# Patient Record
Sex: FEMALE | Race: AMERICAN INDIAN OR ALASKA NATIVE | URBAN - METROPOLITAN AREA
[De-identification: names, ages, dates, MRNs, and addresses within clinical notes are randomized per-mention and may not be internally consistent; named-entity substitution may affect disease eponyms.]

---

## 2024-07-03 ENCOUNTER — OFFICE VISIT (OUTPATIENT)
Age: 64
End: 2024-07-03
Payer: MEDICARE

## 2024-07-03 VITALS — HEIGHT: 64 IN | BODY MASS INDEX: 27.66 KG/M2 | WEIGHT: 162 LBS

## 2024-07-03 DIAGNOSIS — R22.32 MASS OF LEFT WRIST: Primary | ICD-10-CM

## 2024-07-03 PROCEDURE — 3017F COLORECTAL CA SCREEN DOC REV: CPT

## 2024-07-03 PROCEDURE — 1036F TOBACCO NON-USER: CPT

## 2024-07-03 PROCEDURE — 73130 X-RAY EXAM OF HAND: CPT

## 2024-07-03 PROCEDURE — G8400 PT W/DXA NO RESULTS DOC: HCPCS

## 2024-07-03 PROCEDURE — 1123F ACP DISCUSS/DSCN MKR DOCD: CPT

## 2024-07-03 PROCEDURE — 1090F PRES/ABSN URINE INCON ASSESS: CPT

## 2024-07-03 PROCEDURE — G8419 CALC BMI OUT NRM PARAM NOF/U: HCPCS

## 2024-07-03 PROCEDURE — G8427 DOCREV CUR MEDS BY ELIG CLIN: HCPCS

## 2024-07-03 PROCEDURE — 99204 OFFICE O/P NEW MOD 45 MIN: CPT

## 2024-07-03 NOTE — PROGRESS NOTES
of care, and documentation.    Note: This note was completed using voice recognition software.  Any typographical/name errors or mistakes are unintentional.

## 2024-09-03 ENCOUNTER — CLINICAL DOCUMENTATION (OUTPATIENT)
Age: 64
End: 2024-09-03

## 2024-09-03 NOTE — PROGRESS NOTES
Patient called to cancel H&P appt scheduled 9/4 for surgery on 9/17.  Patient was advised if she still planned to proceed with sugery we would need to see her in the office prior to 9/17.  Patient stated she was \"still thinking about it\"  Advised patient that orders would be sent over to the hospital today, as surgery is two weeks away.  Patient was given my direct number here at [facility] to call me ASAP with decision for surgery.

## 2024-09-05 ENCOUNTER — PREP FOR PROCEDURE (OUTPATIENT)
Age: 64
End: 2024-09-05

## 2024-09-05 DIAGNOSIS — R22.32 MASS OF LEFT WRIST: ICD-10-CM

## 2024-09-05 NOTE — PROGRESS NOTES
Called patient to see if she had made a determination about surgery scheduled for 9/17, but reached patient's VM on both home and mobile numbers.  LVM to please call me back as soon as possible.

## 2025-05-01 ENCOUNTER — HOSPITAL ENCOUNTER (OUTPATIENT)
Facility: HOSPITAL | Age: 65
Setting detail: RECURRING SERIES
Discharge: HOME | End: 2025-05-04
Payer: MEDICARE

## 2025-05-01 PROCEDURE — 97530 THERAPEUTIC ACTIVITIES: CPT

## 2025-05-01 PROCEDURE — 97161 PT EVAL LOW COMPLEX 20 MIN: CPT

## 2025-05-01 NOTE — PROGRESS NOTES
[facility]  [address], [facility], [address] Phone: [phone] Fax [phone]  Plan of Care / Statement of Necessity for Physical Therapy Services     Patient Name: Maria Del Rosario Shields : 1960   Medical   Diagnosis: Left foot pain Treatment Diagnosis: M79.672  LEFT FOOT PAIN      Onset Date:  Payor   Payor: MEDICARE / Plan: MEDICARE PART A AND B / Product Type: *No Product type* /    Referral Source: Jose Guadalupe Houston MD Start of Care (SOC): 2025   Prior Hospitalization: See medical history Provider #: [id number]   Prior Level of Function: Functionally IND    Comorbidities: N/A       Assessment / key information:  Pt is a 63 y/o female presenting with c/o pain along the plantar surface of the L foot at the 2nd MTP joint. Referring MD instructed her there was capsulitis present after she struck her foot on the wheel of her son's luggage. Over the last 2-3 months, since the incident, she feels that the toe is only feeling ~50% better. She has not been doing any extra pain modulation/management at home, so she was educated on methods to address the symptoms. There are not many functional implications with the current s/sx, but the pt's timeline of pain suggests she is appropriate for PT services to assist with progressing her towards her PLOF. Anticipate she will not require many sessions with PT, but will address as appropriate.     Gait: No AD. Unremarkable gait pattern on level surfaces.      Palpation: TTP along the MTP of the L 2nd digit. Tenderness noted with passive MTP end-ranges of the 2nd digit. No significant midtarsal mobility deficits. All ray mobility WNL.      Ankle AROM:   Dorsiflexion: 15 deg R, 15 deg L  Plantarflexion: 60 deg R, 60 deg L  Inversion: 30 deg R, 30 deg L  Eversion: 15 deg R, 15 deg L  Great toe extension WNL bilat      Ankle MMT:  Dorsiflexion: 5/5 R, 5/5 L  Plantarflexion: 5/5 R, 5/5

## 2025-05-01 NOTE — PROGRESS NOTES
PHYSICAL / OCCUPATIONAL THERAPY - DAILY TREATMENT NOTE (updated )  For Eval visit    Patient Name: Maria Del Rosario Shields    Date: 2025    : 1960  Insurance: Payor: MEDICARE / Plan: MEDICARE PART A AND B / Product Type: *No Product type* /      Patient  verified yes     Visit #   Current / Total 1 24   Time   In / Out 9:45 10:20   Total Treatment  Time  35   Pain   In / Out 0 0   Subjective Functional Status/Changes: See below     NEXT PROGRESS NOTE DUE: 2025    TREATMENT AREA =  Left foot pain    SUBJECTIVE: Pt presents with c/o L foot pain, more specifically the L second toe, that onset in . It began after she stepped on the wheel of her luggage. Since the onset, she notes 50% improvements in symptoms. She had X-rays taken, which she was told there is capsulitis present. No fractures noted. She notes there is no significant pain/issues with walking, but it is tender to touch. No significant issues wearing closed vs open toed shoes. She describes the pain as throbbing. She does not ice or use medications for the symptoms. No current or h/o N/T in the L foot or second toe. Pt enjoys doing yardwork. Her goal is to \"get back to normal.\"    Pain Levels:  Current: 0/10  Best: 0/10  Worst: 3/10    Co-morbidities: None reported; none via chart review.     Allergies: Penicillins    OBJECTIVE    Modalities Rationale:     decrease inflammation, decrease pain, and increase tissue extensibility to improve patient's ability to progress to PLOF and address remaining functional goals.     min [] Estim Unattended, type/location:                                      []  w/ice    []  w/heat    min [] Estim Attended, type/location:                                     []  w/US     []  w/ice    []  w/heat    []  TENS insruct      min []  Mechanical Traction: type/lbs                   []  pro   []  sup   []  int   []  cont    []  before manual    []  after manual    min []  Ultrasound, settings/location:

## 2025-05-05 ENCOUNTER — HOSPITAL ENCOUNTER (OUTPATIENT)
Facility: HOSPITAL | Age: 65
Setting detail: RECURRING SERIES
Discharge: HOME | End: 2025-05-08
Payer: MEDICARE

## 2025-05-05 PROCEDURE — 97035 APP MDLTY 1+ULTRASOUND EA 15: CPT | Performed by: PHYSICAL THERAPIST

## 2025-05-05 PROCEDURE — 97140 MANUAL THERAPY 1/> REGIONS: CPT | Performed by: PHYSICAL THERAPIST

## 2025-05-05 NOTE — PROGRESS NOTES
PHYSICAL / OCCUPATIONAL THERAPY - DAILY TREATMENT NOTE (updated )  For Eval visit    Patient Name: Maria Del Rosario Shields    Date: 2025    : 1960  Insurance: Payor: MEDICARE / Plan: MEDICARE PART A AND B / Product Type: *No Product type* /      Patient  verified yes     Visit #   Current / Total 2 24   Time   In / Out 900 10:00   Total Treatment  Time  60   Pain   In / Out 0 0   Subjective Functional Status/Changes: Pt retold subjective history. She notes that she really only has pain if she touches her 2nd toe.      NEXT PROGRESS NOTE DUE: 2025    TREATMENT AREA =  Left foot pain    OBJECTIVE    Modalities Rationale:     decrease inflammation, decrease pain, and increase tissue extensibility to improve patient's ability to progress to PLOF and address remaining functional goals.     min [] Estim Unattended, type/location:                                      []  w/ice    []  w/heat    min [] Estim Attended, type/location:                                     []  w/US     []  w/ice    []  w/heat    []  TENS insruct      min []  Mechanical Traction: type/lbs                   []  pro   []  sup   []  int   []  cont    []  before manual    []  after manual   8 min [x]  Ultrasound, settings/location:  50% over the L 2nd MTP joint    min []  Iontophoresis w/ dexamethasone, location:                                               []  take home patch       []  in clinic   10 min  unbilled []  Ice     [x]  Heat    location/position: L foot    min []  Paraffin,  details:     min []  Vasopneumatic Device, press/temp:     min []  Whirlpool / Fluido:    If using vaso (only need to measure limb vaso being performed on)      pre-treatment girth :       post-treatment girth :       measured at (landmark location) :      min []  Other:    Skin assessment post-treatment (if applicable):    []  intact    []  redness- no adverse reaction                 []redness - adverse reaction:          Therapeutic Procedures:  Tx

## 2025-05-08 ENCOUNTER — HOSPITAL ENCOUNTER (OUTPATIENT)
Facility: HOSPITAL | Age: 65
Setting detail: RECURRING SERIES
Discharge: HOME | End: 2025-05-11
Payer: MEDICARE

## 2025-05-08 PROCEDURE — 97112 NEUROMUSCULAR REEDUCATION: CPT

## 2025-05-08 PROCEDURE — 97110 THERAPEUTIC EXERCISES: CPT

## 2025-05-08 PROCEDURE — 97035 APP MDLTY 1+ULTRASOUND EA 15: CPT

## 2025-05-08 PROCEDURE — 97530 THERAPEUTIC ACTIVITIES: CPT

## 2025-05-08 NOTE — PROGRESS NOTES
PHYSICAL / OCCUPATIONAL THERAPY - DAILY TREATMENT NOTE (updated )  For Eval visit    Patient Name: Maria Del Rosario Shields    Date: 2025    : 1960  Insurance: Payor: MEDICARE / Plan: MEDICARE PART A AND B / Product Type: *No Product type* /      Patient  verified yes     Visit #   Current / Total 3 24   Time   In / Out 858 10:00   Total Treatment  Time  62     Pain   In / Out 0 0   Subjective Functional Status/Changes: Patient with no new complaints     NEXT PROGRESS NOTE DUE: 2025    TREATMENT AREA =  Left foot pain    OBJECTIVE    Modalities Rationale:     decrease inflammation, decrease pain, and increase tissue extensibility to improve patient's ability to progress to PLOF and address remaining functional goals.     min [] Estim Unattended, type/location:                                      []  w/ice    []  w/heat    min [] Estim Attended, type/location:                                     []  w/US     []  w/ice    []  w/heat    []  TENS insruct      min []  Mechanical Traction: type/lbs                   []  pro   []  sup   []  int   []  cont    []  before manual    []  after manual   8 min [x]  Ultrasound, settings/location:  50% over the L 2nd MTP joint    min []  Iontophoresis w/ dexamethasone, location:                                               []  take home patch       []  in clinic   10 min  unbilled []  Ice     [x]  Heat    location/position: L foot    min []  Paraffin,  details:     min []  Vasopneumatic Device, press/temp:     min []  Whirlpool / Fluido:    If using vaso (only need to measure limb vaso being performed on)      pre-treatment girth :       post-treatment girth :       measured at (landmark location) :      min []  Other:    Skin assessment post-treatment (if applicable):    []  intact    []  redness- no adverse reaction                 []redness - adverse reaction:          Therapeutic Procedures:  Tx Min Billable or 1:1 Min (if diff from Tx Min) Procedure,

## 2025-05-13 ENCOUNTER — APPOINTMENT (OUTPATIENT)
Facility: HOSPITAL | Age: 65
End: 2025-05-13
Payer: MEDICARE

## 2025-05-20 ENCOUNTER — HOSPITAL ENCOUNTER (OUTPATIENT)
Facility: HOSPITAL | Age: 65
Setting detail: RECURRING SERIES
Discharge: HOME | End: 2025-05-23
Payer: MEDICARE

## 2025-05-20 PROCEDURE — 97110 THERAPEUTIC EXERCISES: CPT

## 2025-05-20 PROCEDURE — 97140 MANUAL THERAPY 1/> REGIONS: CPT

## 2025-05-20 PROCEDURE — 97112 NEUROMUSCULAR REEDUCATION: CPT

## 2025-05-20 NOTE — PROGRESS NOTES
PHYSICAL / OCCUPATIONAL THERAPY - DAILY TREATMENT NOTE (updated )  For Eval visit    Patient Name: Maria Del Rosario Shields    Date: 2025    : 1960  Insurance: Payor: MEDICARE / Plan: MEDICARE PART A AND B / Product Type: *No Product type* /      Patient  verified yes     Visit #   Current / Total 4 24   Time   In / Out 820 916   Total Treatment  Time  56   Pain   In / Out 0 0   Subjective Functional Status/Changes: Reports feeling better     NEXT PROGRESS NOTE DUE: 2025    TREATMENT AREA =  Left foot pain    OBJECTIVE    Modalities Rationale:     decrease inflammation, decrease pain, and increase tissue extensibility to improve patient's ability to progress to PLOF and address remaining functional goals.     min [] Estim Unattended, type/location:                                      []  w/ice    []  w/heat    min [] Estim Attended, type/location:                                     []  w/US     []  w/ice    []  w/heat    []  TENS insruct      min []  Mechanical Traction: type/lbs                   []  pro   []  sup   []  int   []  cont    []  before manual    []  after manual    min []  Ultrasound, settings/location:  50% over the L 2nd MTP joint    min []  Iontophoresis w/ dexamethasone, location:                                               []  take home patch       []  in clinic   10 min  unbilled []  Ice     [x]  Heat    location/position: L foot    min []  Paraffin,  details:     min []  Vasopneumatic Device, press/temp:     min []  Whirlpool / Fluido:    If using vaso (only need to measure limb vaso being performed on)      pre-treatment girth :       post-treatment girth :       measured at (landmark location) :      min []  Other:    Skin assessment post-treatment (if applicable):    []  intact    []  redness- no adverse reaction                 []redness - adverse reaction:          Therapeutic Procedures:  Tx Min Billable or 1:1 Min (if diff from Tx Min) Procedure, Rationale, Specifics

## 2025-05-22 ENCOUNTER — HOSPITAL ENCOUNTER (OUTPATIENT)
Facility: HOSPITAL | Age: 65
Setting detail: RECURRING SERIES
Discharge: HOME | End: 2025-05-25
Payer: MEDICARE

## 2025-05-22 PROCEDURE — 97110 THERAPEUTIC EXERCISES: CPT

## 2025-05-22 PROCEDURE — 97140 MANUAL THERAPY 1/> REGIONS: CPT

## 2025-05-22 PROCEDURE — 97530 THERAPEUTIC ACTIVITIES: CPT

## 2025-05-22 NOTE — PROGRESS NOTES
PHYSICAL / OCCUPATIONAL THERAPY - DAILY TREATMENT NOTE (updated )  For Eval visit    Patient Name: Maria Del Rosario Shields    Date: 2025    : 1960  Insurance: Payor: MEDICARE / Plan: MEDICARE PART A AND B / Product Type: *No Product type* /      Patient  verified yes     Visit #   Current / Total 5 24   Time   In / Out 900 950   Total Treatment  Time  50   Pain   In / Out 0 0   Subjective Functional Status/Changes: No specific complaints. Sees her doctor podiatrist today     NEXT PROGRESS NOTE DUE: 2025    TREATMENT AREA =  Left foot pain    OBJECTIVE    Modalities Rationale:     decrease inflammation, decrease pain, and increase tissue extensibility to improve patient's ability to progress to PLOF and address remaining functional goals.     min [] Estim Unattended, type/location:                                      []  w/ice    []  w/heat    min [] Estim Attended, type/location:                                     []  w/US     []  w/ice    []  w/heat    []  TENS insruct      min []  Mechanical Traction: type/lbs                   []  pro   []  sup   []  int   []  cont    []  before manual    []  after manual    min []  Ultrasound, settings/location:  50% over the L 2nd MTP joint    min []  Iontophoresis w/ dexamethasone, location:                                               []  take home patch       []  in clinic   PD min  unbilled []  Ice     [x]  Heat    location/position: L foot    min []  Paraffin,  details:     min []  Vasopneumatic Device, press/temp:     min []  Whirlpool / Fluido:    If using vaso (only need to measure limb vaso being performed on)      pre-treatment girth :       post-treatment girth :       measured at (landmark location) :      min []  Other:    Skin assessment post-treatment (if applicable):    []  intact    []  redness- no adverse reaction                 []redness - adverse reaction:          Therapeutic Procedures:  Tx Min Billable or 1:1 Min (if diff from Tx

## 2025-05-27 ENCOUNTER — HOSPITAL ENCOUNTER (OUTPATIENT)
Facility: HOSPITAL | Age: 65
Setting detail: RECURRING SERIES
Discharge: HOME | End: 2025-05-30
Payer: MEDICARE

## 2025-05-27 PROCEDURE — 97530 THERAPEUTIC ACTIVITIES: CPT

## 2025-05-27 PROCEDURE — 97110 THERAPEUTIC EXERCISES: CPT

## 2025-05-27 NOTE — PROGRESS NOTES
Refill policies:    • Allow 2-3 business days for refills; controlled substances may take longer.   • Contact your pharmacy at least 5 days prior to running out of medication and have them send an electronic request or submit request through the “request re been approved by your insurer. Depending on your insurance carrier, approval may take 3-10 days. It is highly recommended patients contact their insurance carrier directly to determine coverage.   If test is done without insurance authorization, patient ma IE 3/10 at worst     3.  Pt will be IND with a finalized HEP to promote long-term carryover in symptoms and functional use of the L foot/LE.   Status at IE Goal initiated     PLAN  yes Continue plan of care  [x]  Upgrade activities as tolerated  []  Discharge due to :  []  Other:        Piedad Canela PTA    5/27/2025    8:27 AM    Future Appointments   Date Time Provider Department Center   5/29/2025  9:40 AM Piedad Canela PTA [facility]   6/3/2025  8:20 AM Rica Herring, PT [facility]   6/5/2025  9:00 AM Piedad Canela PTA [facility]     If an interpreting service was utilized for treatment of this patient, the contents of this document represent the material reviewed with the patient via the .    Charge Capture

## 2025-05-29 ENCOUNTER — HOSPITAL ENCOUNTER (OUTPATIENT)
Facility: HOSPITAL | Age: 65
Setting detail: RECURRING SERIES
End: 2025-05-29
Payer: MEDICARE

## 2025-05-29 PROCEDURE — 97530 THERAPEUTIC ACTIVITIES: CPT

## 2025-05-29 NOTE — PROGRESS NOTES
[facility] AT [facility]  [address], [facility], [address]  Phone: [phone] Fax [phone]  DISCHARGE SUMMARY  Patient Name: Maria Del Rosario Shields : 1960   Treatment/Medical Diagnosis: Left foot pain   Referral Source: Jose Guadalupe Houston MD     Date of Initial Visit: 2025 Attended Visits: 7 Missed Visits: 0     SUMMARY OF TREATMENT  Manual therapy, therapeutic exercises, therapeutic activities, and neuromuscular re-education to increase her foot intrinsic strength, improve her mobility and improve her pain, to improve her quality of life    CURRENT STATUS    Subjective:   Functional Gains: left toe doesn't hurt as much anymore. Can take steps with less pain   Functional Deficits: still slight pain in when doing yard work.   % improvement: 95%  Pain Average: 1/10                  Best: 010                Worst: 1/10    Objective Measurements:  Gait: No AD. Unremarkable gait pattern on level surfaces.      Palpation: no noted tenderness     Ankle AROM:   Dorsiflexion: 15 deg R, 15 deg ; discharge, L 20 deg  Plantarflexion: at eval 60 deg R, 60 deg ; discharge, L 74 deg  Inversion: at eval  30 deg R, 30 deg;  discharge, L 35 deg  Eversion: at eval 15 deg R, 15 deg ; discharge, L 20 deg  Great toe extension WNL bilat WFL      Ankle MMT:  Dorsiflexion: at eval 5/5 R, 5/5 L ;  discharge, DF: 5/5  Plantarflexion: at eval 5/5 R, 5/5 ; discharge, L PF 5/5  Inversion: at eval 5/5 R, 5/5 L ; discharge, inversion 5/5  Eversion: at eval  5/5 R, 5/5 L ; discharge, eversion 5/5    Assessment:  Ms. Shields has been attending physical therapy since 2025 for her right foot pain. Subjectively she reports she has seen great improvement in her ability to walk with decreased pain levels.  She reports that she only gets pain when she works in her yard wearing the wrong boots but the pain is minimal. Objectively she has met all of her goals and demonstrates

## 2025-05-29 NOTE — PROGRESS NOTES
PHYSICAL / OCCUPATIONAL THERAPY - DAILY TREATMENT NOTE (updated )  For Eval visit    Patient Name: Maria Del Rosario Shields    Date: 2025    : 1960  Insurance: Payor: MEDICARE / Plan: MEDICARE PART A AND B / Product Type: *No Product type* /      Patient  verified yes     Visit #   Current / Total 7 24   Time   In / Out 940 1016   Total Treatment  Time  36   Pain   In / Out 0 0   Subjective Functional Status/Changes: Reports really no issues now.          TREATMENT AREA =  Left foot pain    OBJECTIVE    Modalities Rationale:     decrease inflammation, decrease pain, and increase tissue extensibility to improve patient's ability to progress to PLOF and address remaining functional goals.     min [] Estim Unattended, type/location:                                      []  w/ice    []  w/heat    min [] Estim Attended, type/location:                                     []  w/US     []  w/ice    []  w/heat    []  TENS insruct      min []  Mechanical Traction: type/lbs                   []  pro   []  sup   []  int   []  cont    []  before manual    []  after manual    min []  Ultrasound, settings/location:  50% over the L 2nd MTP joint    min []  Iontophoresis w/ dexamethasone, location:                                               []  take home patch       []  in clinic   PD min  unbilled []  Ice     [x]  Heat    location/position: L foot    min []  Paraffin,  details:     min []  Vasopneumatic Device, press/temp:     min []  Whirlpool / Fluido:    If using vaso (only need to measure limb vaso being performed on)      pre-treatment girth :       post-treatment girth :       measured at (landmark location) :      min []  Other:    Skin assessment post-treatment (if applicable):    []  intact    []  redness- no adverse reaction                 []redness - adverse reaction:          Therapeutic Procedures:  Tx Min Billable or 1:1 Min (if diff from Tx Min) Procedure, Rationale, Specifics    [id number] Therapeutic

## 2025-06-02 ENCOUNTER — HOSPITAL ENCOUNTER (OUTPATIENT)
Facility: HOSPITAL | Age: 65
Setting detail: RECURRING SERIES
Discharge: HOME | End: 2025-06-05